# Patient Record
Sex: FEMALE | Race: WHITE
[De-identification: names, ages, dates, MRNs, and addresses within clinical notes are randomized per-mention and may not be internally consistent; named-entity substitution may affect disease eponyms.]

---

## 2019-08-30 ENCOUNTER — HOSPITAL ENCOUNTER (INPATIENT)
Dept: HOSPITAL 12 - ER | Age: 79
LOS: 13 days | Discharge: HOME HEALTH | DRG: 885 | End: 2019-09-12
Payer: MEDICARE

## 2019-08-30 VITALS — BODY MASS INDEX: 25.76 KG/M2 | WEIGHT: 170 LBS | HEIGHT: 68 IN

## 2019-08-30 DIAGNOSIS — Z59.0: ICD-10-CM

## 2019-08-30 DIAGNOSIS — E11.65: ICD-10-CM

## 2019-08-30 DIAGNOSIS — Z87.440: ICD-10-CM

## 2019-08-30 DIAGNOSIS — M79.7: ICD-10-CM

## 2019-08-30 DIAGNOSIS — G89.29: ICD-10-CM

## 2019-08-30 DIAGNOSIS — D69.6: ICD-10-CM

## 2019-08-30 DIAGNOSIS — F20.0: Primary | ICD-10-CM

## 2019-08-30 DIAGNOSIS — N39.0: ICD-10-CM

## 2019-08-30 PROCEDURE — A4663 DIALYSIS BLOOD PRESSURE CUFF: HCPCS

## 2019-08-30 SDOH — ECONOMIC STABILITY - HOUSING INSECURITY: HOMELESSNESS: Z59.0

## 2019-08-31 VITALS — DIASTOLIC BLOOD PRESSURE: 76 MMHG | SYSTOLIC BLOOD PRESSURE: 164 MMHG

## 2019-08-31 VITALS — SYSTOLIC BLOOD PRESSURE: 122 MMHG | DIASTOLIC BLOOD PRESSURE: 65 MMHG

## 2019-08-31 VITALS — DIASTOLIC BLOOD PRESSURE: 60 MMHG | SYSTOLIC BLOOD PRESSURE: 121 MMHG

## 2019-08-31 VITALS — SYSTOLIC BLOOD PRESSURE: 147 MMHG | DIASTOLIC BLOOD PRESSURE: 69 MMHG

## 2019-08-31 RX ADMIN — OLANZAPINE SCH MG: 5 TABLET, ORALLY DISINTEGRATING ORAL at 21:22

## 2019-08-31 RX ADMIN — HYDROCODONE BITARTRATE AND ACETAMINOPHEN PRN TAB: 5; 325 TABLET ORAL at 21:30

## 2019-08-31 NOTE — NUR
HAND OFF AND SBAR GIVEN TO BIBI MALDONADO ([facility])



PT WILL BE ADMITTED UNDER DR SORTO



[facility] SWAB DONE.

PT IS COMPLIANT, DENIES SI/HI, CALM AND COMPLIANT

TRANSPORT TO ROOM ACCOMPANIED BY ER STAFF

## 2019-08-31 NOTE — NUR
Patient C/O lower back pain 5/10. Offered Norco 1 Tab PO PRN. Patient is alert and oriented. 
Cooperative and calm with care. Educated patient on her night medication. Participated in 
the plan of care.

## 2019-08-31 NOTE — NUR
received to care, from the emergency room, on a 72 hour hold, for gravely disabled, a 
transfer from [facility], in [place]. according to the hold, she 
came to the hospital, and appeared "grossly disorganized and psychotic". she had no viable 
self care plan, and was deemed by the evaluator to be unable to care for self, with out 
"adequate assistance". upon arrival on the unit, she was cooperative with interview, and 
assessment. denied si,or desire to harm self/others. pts rights book was given. pt was 
advised of [facility]. she stated she stays at a hotel by herself, and went to the hospital, after 
falling down. she denied any psychotic sx. after being interviewed, she was assisted to bed. 
currently appears to be asleep. no distress noted.

## 2019-09-01 VITALS — DIASTOLIC BLOOD PRESSURE: 62 MMHG | SYSTOLIC BLOOD PRESSURE: 115 MMHG

## 2019-09-01 VITALS — SYSTOLIC BLOOD PRESSURE: 144 MMHG | DIASTOLIC BLOOD PRESSURE: 63 MMHG

## 2019-09-01 VITALS — SYSTOLIC BLOOD PRESSURE: 133 MMHG | DIASTOLIC BLOOD PRESSURE: 71 MMHG

## 2019-09-01 LAB
BASOPHILS # BLD AUTO: 0 K/UL (ref 0–8)
BASOPHILS NFR BLD AUTO: 0.7 % (ref 0–2)
BUN SERPL-MCNC: 10 MG/DL (ref 7–18)
CHLORIDE SERPL-SCNC: 106 MMOL/L (ref 98–107)
CHOLEST SERPL-MCNC: 154 MG/DL (ref ?–200)
CO2 SERPL-SCNC: 24 MMOL/L (ref 21–32)
CREAT SERPL-MCNC: 0.7 MG/DL (ref 0.6–1.3)
EOSINOPHIL # BLD AUTO: 0.2 K/UL (ref 0–0.7)
EOSINOPHIL NFR BLD AUTO: 3.1 % (ref 0–7)
GLUCOSE SERPL-MCNC: 111 MG/DL (ref 74–106)
HCT VFR BLD AUTO: 36.7 % (ref 31.2–41.9)
HDLC SERPL-MCNC: 31 MG/DL (ref 40–60)
HGB BLD-MCNC: 12.3 G/DL (ref 10.9–14.3)
LYMPHOCYTES # BLD AUTO: 2.5 K/UL (ref 20–40)
LYMPHOCYTES NFR BLD AUTO: 39.8 % (ref 20.5–51.5)
MAGNESIUM SERPL-MCNC: 1.8 MG/DL (ref 1.8–2.4)
MCH RBC QN AUTO: 28.5 UUG (ref 24.7–32.8)
MCHC RBC AUTO-ENTMCNC: 34 G/DL (ref 32.3–35.6)
MCV RBC AUTO: 84.8 FL (ref 75.5–95.3)
MONOCYTES # BLD AUTO: 0.6 K/UL (ref 2–10)
MONOCYTES NFR BLD AUTO: 9.1 % (ref 0–11)
NEUTROPHILS # BLD AUTO: 3 K/UL (ref 1.8–8.9)
NEUTROPHILS NFR BLD AUTO: 47.3 % (ref 38.5–71.5)
PHOSPHATE SERPL-MCNC: 3.8 MG/DL (ref 2.5–4.9)
PLATELET # BLD AUTO: 167 K/UL (ref 179–408)
POTASSIUM SERPL-SCNC: 3.7 MMOL/L (ref 3.5–5.1)
RBC # BLD AUTO: 4.32 MIL/UL (ref 3.63–4.92)
TRIGL SERPL-MCNC: 162 MG/DL (ref 30–150)
TSH SERPL DL<=0.005 MIU/L-ACNC: 2.12 MIU/ML (ref 0.36–3.74)
WBC # BLD AUTO: 6.2 K/UL (ref 3.8–11.8)

## 2019-09-01 RX ADMIN — Medication SCH MG: at 17:27

## 2019-09-01 RX ADMIN — HYDROCODONE BITARTRATE AND ACETAMINOPHEN PRN TAB: 5; 325 TABLET ORAL at 06:27

## 2019-09-01 RX ADMIN — METFORMIN HYDROCHLORIDE SCH MG: 500 TABLET ORAL at 17:28

## 2019-09-01 RX ADMIN — OLANZAPINE SCH MG: 5 TABLET, ORALLY DISINTEGRATING ORAL at 20:33

## 2019-09-02 VITALS — DIASTOLIC BLOOD PRESSURE: 88 MMHG | SYSTOLIC BLOOD PRESSURE: 135 MMHG

## 2019-09-02 VITALS — DIASTOLIC BLOOD PRESSURE: 65 MMHG | SYSTOLIC BLOOD PRESSURE: 113 MMHG

## 2019-09-02 VITALS — DIASTOLIC BLOOD PRESSURE: 70 MMHG | SYSTOLIC BLOOD PRESSURE: 119 MMHG

## 2019-09-02 RX ADMIN — OLANZAPINE SCH MG: 5 TABLET, ORALLY DISINTEGRATING ORAL at 20:07

## 2019-09-02 RX ADMIN — HYDROCODONE BITARTRATE AND ACETAMINOPHEN PRN TAB: 5; 325 TABLET ORAL at 02:18

## 2019-09-02 RX ADMIN — METFORMIN HYDROCHLORIDE SCH MG: 500 TABLET ORAL at 17:20

## 2019-09-02 RX ADMIN — MAGNESIUM HYDROXIDE PRN ML: 400 SUSPENSION ORAL at 08:47

## 2019-09-02 RX ADMIN — Medication SCH MG: at 16:46

## 2019-09-02 RX ADMIN — Medication SCH MG: at 08:39

## 2019-09-02 RX ADMIN — HYDROCODONE BITARTRATE AND ACETAMINOPHEN PRN TAB: 5; 325 TABLET ORAL at 15:41

## 2019-09-02 RX ADMIN — METFORMIN HYDROCHLORIDE SCH MG: 500 TABLET ORAL at 08:39

## 2019-09-02 RX ADMIN — MAGNESIUM HYDROXIDE PRN ML: 400 SUSPENSION ORAL at 21:49

## 2019-09-03 VITALS — SYSTOLIC BLOOD PRESSURE: 121 MMHG | DIASTOLIC BLOOD PRESSURE: 63 MMHG

## 2019-09-03 VITALS — DIASTOLIC BLOOD PRESSURE: 63 MMHG | SYSTOLIC BLOOD PRESSURE: 147 MMHG

## 2019-09-03 VITALS — DIASTOLIC BLOOD PRESSURE: 80 MMHG | SYSTOLIC BLOOD PRESSURE: 143 MMHG

## 2019-09-03 RX ADMIN — Medication SCH MG: at 08:19

## 2019-09-03 RX ADMIN — OLANZAPINE SCH MG: 5 TABLET, ORALLY DISINTEGRATING ORAL at 21:05

## 2019-09-03 RX ADMIN — HYDROCODONE BITARTRATE AND ACETAMINOPHEN PRN TAB: 5; 325 TABLET ORAL at 21:12

## 2019-09-03 RX ADMIN — METFORMIN HYDROCHLORIDE SCH MG: 500 TABLET ORAL at 17:09

## 2019-09-03 RX ADMIN — Medication SCH MG: at 17:09

## 2019-09-03 RX ADMIN — LIDOCAINE SCH PATCH: 50 PATCH CUTANEOUS at 18:25

## 2019-09-03 RX ADMIN — METFORMIN HYDROCHLORIDE SCH MG: 500 TABLET ORAL at 08:19

## 2019-09-03 RX ADMIN — HYDROCODONE BITARTRATE AND ACETAMINOPHEN PRN TAB: 5; 325 TABLET ORAL at 00:13

## 2019-09-03 NOTE — NUR
Received Pi in bed awake and A+Ox3. Expresses flat affect and depressed mood. Isolative and 
seclusive, remains in her room for most of the shift. Initially refused HS Zyprexa stating, 
"I've never taken any meds and this makes me feel so tired." Pt was educated on risks, 
benefits, and reportable s/e. Pt also encouraged to speak to her psychiatrist regarding her 
dosage and concerns. Pt took the medication after encouragement and education provided. 
Denies SI/HI/AH/VH. C/o constipation, prune juice and MOM provided per request. VS stable 
denies pain. C/o insomnia and restlessness Restoril 7.5mg administered with good effect.

## 2019-09-03 NOTE — NUR
received to care, asleep,in bed, but easy to awaken,and pleasant upon approach. PRN norco 
given at 2112, for lower back pain. as of 2200, she appears to be asleep. no distress noted. 
will continue to monitor closely.

## 2019-09-04 VITALS — SYSTOLIC BLOOD PRESSURE: 138 MMHG | DIASTOLIC BLOOD PRESSURE: 91 MMHG

## 2019-09-04 VITALS — SYSTOLIC BLOOD PRESSURE: 124 MMHG | DIASTOLIC BLOOD PRESSURE: 61 MMHG

## 2019-09-04 VITALS — SYSTOLIC BLOOD PRESSURE: 149 MMHG | DIASTOLIC BLOOD PRESSURE: 71 MMHG

## 2019-09-04 RX ADMIN — METFORMIN HYDROCHLORIDE SCH MG: 500 TABLET ORAL at 17:02

## 2019-09-04 RX ADMIN — METFORMIN HYDROCHLORIDE SCH MG: 500 TABLET ORAL at 08:12

## 2019-09-04 RX ADMIN — Medication SCH MG: at 08:12

## 2019-09-04 RX ADMIN — HYDROCODONE BITARTRATE AND ACETAMINOPHEN PRN TAB: 5; 325 TABLET ORAL at 18:22

## 2019-09-04 RX ADMIN — LIDOCAINE SCH PATCH: 50 PATCH CUTANEOUS at 16:58

## 2019-09-04 RX ADMIN — OLANZAPINE SCH MG: 5 TABLET, ORALLY DISINTEGRATING ORAL at 20:47

## 2019-09-05 VITALS — SYSTOLIC BLOOD PRESSURE: 110 MMHG | DIASTOLIC BLOOD PRESSURE: 59 MMHG

## 2019-09-05 VITALS — SYSTOLIC BLOOD PRESSURE: 133 MMHG | DIASTOLIC BLOOD PRESSURE: 87 MMHG

## 2019-09-05 RX ADMIN — METFORMIN HYDROCHLORIDE SCH MG: 500 TABLET ORAL at 09:05

## 2019-09-05 RX ADMIN — DOCUSATE SODIUM SCH MG: 100 CAPSULE, LIQUID FILLED ORAL at 09:05

## 2019-09-05 RX ADMIN — HYDROCODONE BITARTRATE AND ACETAMINOPHEN PRN TAB: 5; 325 TABLET ORAL at 00:04

## 2019-09-05 RX ADMIN — HYDROCODONE BITARTRATE AND ACETAMINOPHEN PRN TAB: 5; 325 TABLET ORAL at 20:58

## 2019-09-05 RX ADMIN — LIDOCAINE SCH PATCH: 50 PATCH CUTANEOUS at 16:50

## 2019-09-05 RX ADMIN — METFORMIN HYDROCHLORIDE SCH MG: 500 TABLET ORAL at 16:50

## 2019-09-05 RX ADMIN — OLANZAPINE SCH MG: 5 TABLET, ORALLY DISINTEGRATING ORAL at 20:54

## 2019-09-05 NOTE — NUR
Patient slept 5.30 hours so far. Medicated once in the night for back pain. Medication was 
effective. No acute distress noted and patient remains asleep at this time.

## 2019-09-05 NOTE — NUR
PATIENT RECEIVED IN BED AWAKE. PATIENT IS ISOLATIVE/WITHDRAWN. PATIENT IS CALM AND 
COOPERATIVE. PATIENT IS EASILY AGITATED IS REDIRECTABLE. PATIENT COMPLAINT WITH MEDICATION. 
PATIENT STATES 9/10 BACK PAIN, PAIN MEDICATION GIVEN AS ORDERED. NO AGGRESSIVE OR COMBATIVE 
BEHAVIOR NOTED WILL CONTINUE TO MONITOR. BED IN LOWEST POSITION, BED LOCKED, AND BED ALARM 
ON WHILE IN BED.

## 2019-09-06 VITALS — SYSTOLIC BLOOD PRESSURE: 122 MMHG | DIASTOLIC BLOOD PRESSURE: 69 MMHG

## 2019-09-06 VITALS — DIASTOLIC BLOOD PRESSURE: 65 MMHG | SYSTOLIC BLOOD PRESSURE: 122 MMHG

## 2019-09-06 RX ADMIN — DOCUSATE SODIUM SCH MG: 100 CAPSULE, LIQUID FILLED ORAL at 08:42

## 2019-09-06 RX ADMIN — METFORMIN HYDROCHLORIDE SCH MG: 500 TABLET ORAL at 08:42

## 2019-09-06 RX ADMIN — HYDROCODONE BITARTRATE AND ACETAMINOPHEN PRN TAB: 5; 325 TABLET ORAL at 20:45

## 2019-09-06 RX ADMIN — OLANZAPINE SCH MG: 5 TABLET, ORALLY DISINTEGRATING ORAL at 20:32

## 2019-09-06 RX ADMIN — METFORMIN HYDROCHLORIDE SCH MG: 500 TABLET ORAL at 17:07

## 2019-09-06 RX ADMIN — LIDOCAINE SCH PATCH: 50 PATCH CUTANEOUS at 17:07

## 2019-09-07 VITALS — DIASTOLIC BLOOD PRESSURE: 57 MMHG | SYSTOLIC BLOOD PRESSURE: 120 MMHG

## 2019-09-07 VITALS — DIASTOLIC BLOOD PRESSURE: 63 MMHG | SYSTOLIC BLOOD PRESSURE: 113 MMHG

## 2019-09-07 VITALS — DIASTOLIC BLOOD PRESSURE: 69 MMHG | SYSTOLIC BLOOD PRESSURE: 121 MMHG

## 2019-09-07 RX ADMIN — METFORMIN HYDROCHLORIDE SCH MG: 500 TABLET ORAL at 08:56

## 2019-09-07 RX ADMIN — MAGNESIUM HYDROXIDE PRN ML: 400 SUSPENSION ORAL at 20:23

## 2019-09-07 RX ADMIN — OLANZAPINE SCH MG: 5 TABLET, ORALLY DISINTEGRATING ORAL at 20:23

## 2019-09-07 RX ADMIN — DOCUSATE SODIUM SCH MG: 100 CAPSULE, LIQUID FILLED ORAL at 08:56

## 2019-09-07 RX ADMIN — HYDROCODONE BITARTRATE AND ACETAMINOPHEN PRN TAB: 5; 325 TABLET ORAL at 22:56

## 2019-09-07 RX ADMIN — LIDOCAINE SCH PATCH: 50 PATCH CUTANEOUS at 17:04

## 2019-09-07 RX ADMIN — METFORMIN HYDROCHLORIDE SCH MG: 500 TABLET ORAL at 17:04

## 2019-09-08 VITALS — DIASTOLIC BLOOD PRESSURE: 61 MMHG | SYSTOLIC BLOOD PRESSURE: 131 MMHG

## 2019-09-08 VITALS — DIASTOLIC BLOOD PRESSURE: 69 MMHG | SYSTOLIC BLOOD PRESSURE: 147 MMHG

## 2019-09-08 VITALS — SYSTOLIC BLOOD PRESSURE: 143 MMHG | DIASTOLIC BLOOD PRESSURE: 70 MMHG

## 2019-09-08 RX ADMIN — DOCUSATE SODIUM SCH MG: 100 CAPSULE, LIQUID FILLED ORAL at 08:51

## 2019-09-08 RX ADMIN — LIDOCAINE SCH PATCH: 50 PATCH CUTANEOUS at 16:29

## 2019-09-08 RX ADMIN — OLANZAPINE SCH MG: 5 TABLET, ORALLY DISINTEGRATING ORAL at 20:27

## 2019-09-08 RX ADMIN — HYDROCODONE BITARTRATE AND ACETAMINOPHEN PRN TAB: 5; 325 TABLET ORAL at 20:37

## 2019-09-08 RX ADMIN — METFORMIN HYDROCHLORIDE SCH MG: 500 TABLET ORAL at 17:09

## 2019-09-08 RX ADMIN — METFORMIN HYDROCHLORIDE SCH MG: 500 TABLET ORAL at 08:51

## 2019-09-08 NOTE — NUR
RECEIVED PATIENT IN BED AWAKE. PATIENT IS ISOLATIVE,WITHDRAWN BUT CALM AND COOPERATIVE. 
OCCASIONALLY IRRITATED BUT RE DIRECTABLE. SHE IS COMPLAINT WITH MEDICATION.COMPLAINED OF 
LOWER BACK ACHE RATED 5/10 AND REQUESTED FOR NORCO. SAME GIVEN AS ORDERED WITH FAIRLY GOOD 
EFFECT. VISUAL CHECKS MADE ON HER FOR SAFETY. WILL CONTINUE TO MONITOR. BED IN LOWEST 
POSITION, BED LOCKED, AND BED ALARM ON WHILE IN BED.

## 2019-09-09 VITALS — DIASTOLIC BLOOD PRESSURE: 62 MMHG | SYSTOLIC BLOOD PRESSURE: 111 MMHG

## 2019-09-09 VITALS — DIASTOLIC BLOOD PRESSURE: 72 MMHG | SYSTOLIC BLOOD PRESSURE: 139 MMHG

## 2019-09-09 VITALS — DIASTOLIC BLOOD PRESSURE: 86 MMHG | SYSTOLIC BLOOD PRESSURE: 146 MMHG

## 2019-09-09 RX ADMIN — DOCUSATE SODIUM SCH MG: 100 CAPSULE, LIQUID FILLED ORAL at 09:12

## 2019-09-09 RX ADMIN — LIDOCAINE SCH PATCH: 50 PATCH CUTANEOUS at 16:29

## 2019-09-09 RX ADMIN — METFORMIN HYDROCHLORIDE SCH MG: 500 TABLET ORAL at 09:13

## 2019-09-09 RX ADMIN — HYDROCODONE BITARTRATE AND ACETAMINOPHEN PRN TAB: 5; 325 TABLET ORAL at 21:14

## 2019-09-09 RX ADMIN — OLANZAPINE SCH MG: 5 TABLET, ORALLY DISINTEGRATING ORAL at 20:08

## 2019-09-09 RX ADMIN — MAGNESIUM HYDROXIDE PRN ML: 400 SUSPENSION ORAL at 16:28

## 2019-09-09 RX ADMIN — METFORMIN HYDROCHLORIDE SCH MG: 500 TABLET ORAL at 18:24

## 2019-09-10 VITALS — SYSTOLIC BLOOD PRESSURE: 120 MMHG | DIASTOLIC BLOOD PRESSURE: 49 MMHG

## 2019-09-10 VITALS — DIASTOLIC BLOOD PRESSURE: 61 MMHG | SYSTOLIC BLOOD PRESSURE: 112 MMHG

## 2019-09-10 VITALS — SYSTOLIC BLOOD PRESSURE: 115 MMHG | DIASTOLIC BLOOD PRESSURE: 51 MMHG

## 2019-09-10 RX ADMIN — HYDROCODONE BITARTRATE AND ACETAMINOPHEN PRN TAB: 5; 325 TABLET ORAL at 21:25

## 2019-09-10 RX ADMIN — DOCUSATE SODIUM SCH MG: 100 CAPSULE, LIQUID FILLED ORAL at 08:29

## 2019-09-10 RX ADMIN — LIDOCAINE SCH PATCH: 50 PATCH CUTANEOUS at 16:15

## 2019-09-10 RX ADMIN — METFORMIN HYDROCHLORIDE SCH MG: 500 TABLET ORAL at 08:29

## 2019-09-10 RX ADMIN — METFORMIN HYDROCHLORIDE SCH MG: 500 TABLET ORAL at 18:58

## 2019-09-10 RX ADMIN — Medication SCH EACH: at 21:31

## 2019-09-10 RX ADMIN — OLANZAPINE SCH MG: 5 TABLET, ORALLY DISINTEGRATING ORAL at 21:25

## 2019-09-10 NOTE — NUR
Patient received awake in bed, resting comfortably with 1:1 sitter in room.  Patient is 
alert/oriented x3 and is complaining of 6/10 pain related to her diagnosis of chronic back 
pain.  All safety and fall precaution measures are in place.  Will continue to monitor.

## 2019-09-10 NOTE — NUR
END OF SHIFT REPORT

Transfer to 325 as GPS overflow with 1:1 sitter at bedside; pt slept well in between care; 
c/o pain x1 and Norco given; pt requested for restoril and slept 5 hours and 45 mins; 
assisted to meet hygiene needs; continue to monitor;

## 2019-09-10 NOTE — NUR
Patient is AO2-3, no distress, cooperative with treatment, 1:1 sitter at bedside; 

continue to monitor;

## 2019-09-10 NOTE — NUR
Independent living consultant is here, at request of SW, to evaluate patient for placement 
in assisted living/independent living facility.

## 2019-09-11 VITALS — SYSTOLIC BLOOD PRESSURE: 147 MMHG | DIASTOLIC BLOOD PRESSURE: 69 MMHG

## 2019-09-11 VITALS — SYSTOLIC BLOOD PRESSURE: 113 MMHG | DIASTOLIC BLOOD PRESSURE: 55 MMHG

## 2019-09-11 VITALS — SYSTOLIC BLOOD PRESSURE: 104 MMHG | DIASTOLIC BLOOD PRESSURE: 55 MMHG

## 2019-09-11 RX ADMIN — Medication SCH EACH: at 20:32

## 2019-09-11 RX ADMIN — METFORMIN HYDROCHLORIDE SCH MG: 500 TABLET ORAL at 09:30

## 2019-09-11 RX ADMIN — Medication SCH EACH: at 06:24

## 2019-09-11 RX ADMIN — HYDROCODONE BITARTRATE AND ACETAMINOPHEN PRN TAB: 5; 325 TABLET ORAL at 20:49

## 2019-09-11 RX ADMIN — SODIUM CHLORIDE PRN UNIT: 9 INJECTION, SOLUTION INTRAVENOUS at 20:42

## 2019-09-11 RX ADMIN — METFORMIN HYDROCHLORIDE SCH MG: 500 TABLET ORAL at 16:56

## 2019-09-11 RX ADMIN — DOCUSATE SODIUM SCH MG: 100 CAPSULE, LIQUID FILLED ORAL at 09:30

## 2019-09-11 RX ADMIN — Medication SCH EACH: at 11:58

## 2019-09-11 RX ADMIN — Medication SCH EACH: at 16:56

## 2019-09-11 RX ADMIN — LIDOCAINE SCH PATCH: 50 PATCH CUTANEOUS at 16:56

## 2019-09-11 RX ADMIN — SODIUM CHLORIDE PRN UNIT: 9 INJECTION, SOLUTION INTRAVENOUS at 17:00

## 2019-09-11 RX ADMIN — OLANZAPINE SCH MG: 5 TABLET, ORALLY DISINTEGRATING ORAL at 20:06

## 2019-09-11 NOTE — NUR
Discharge Planning: 



Patient was evaluated by Assisted Living placement Agency - [facility] [[phone]]. 
Per Admissions - Arturo, patient was accepted for Assisted Living Placement at [facility] 
[[address] [place] [address]]. Patient scheduled for tentative dc tomorrow at 
11:00a. Selena, Director [[phone]] at [facility], will be ready to receive patient. 
This writer informed nursing - Toña and antonio request for home health referral.

## 2019-09-11 NOTE — NUR
Received patient in bed, asleep but easily arouse to touch and sound. calm and cooperative. 
med compliant. no behavioral issue at this time. will continue to monitor patient safety.

## 2019-09-11 NOTE — NUR
Patient slept comfortably throughout night with 1:1 sitter at bedside. Complaint of pain was 
addressed with prescribed analgesics.  Patient was compliant with all aspects of care and 
medication regimen. All safety and fall precaution measures remain in place.    .

## 2019-09-11 NOTE — NUR
At this time patient brought down to room 139B. Patient AAOX4. placed in bed with 
assistance. 

-------------------------------------------------------------------------------

Addendum: 09/11/19 at 1858 by SANAZ FREMEAN RN

-------------------------------------------------------------------------------

Care plan will be endorse to incoming shift.

## 2019-09-11 NOTE — NUR
Received patient in bed, asleep and easy to wake up . Denies sob or pain at this time. All 
needs met. Safety and fall precautions in place. Call light in reach, bed in low position 
and locked. Will continue to monitor.

## 2019-09-12 VITALS — SYSTOLIC BLOOD PRESSURE: 136 MMHG | DIASTOLIC BLOOD PRESSURE: 65 MMHG

## 2019-09-12 RX ADMIN — METFORMIN HYDROCHLORIDE SCH MG: 500 TABLET ORAL at 08:17

## 2019-09-12 RX ADMIN — Medication SCH EACH: at 12:16

## 2019-09-12 RX ADMIN — Medication SCH EACH: at 06:34

## 2019-09-12 RX ADMIN — DOCUSATE SODIUM SCH MG: 100 CAPSULE, LIQUID FILLED ORAL at 08:17

## 2019-09-12 RX ADMIN — SODIUM CHLORIDE PRN UNIT: 9 INJECTION, SOLUTION INTRAVENOUS at 12:21

## 2019-09-12 NOTE — NUR
Patient is discharged via set transportation, belongings given and dc papers signed. Took 
patient outside via wheel chair

Patient condition is Stable

## 2021-11-30 NOTE — NUR
Pt arrived in the unit for rehab admission at 2040 from [facility] via gurney. Admitting Dx: R hip 
fx, s/p R hip closed percutaneous pinning on 11/27/2021. On 2L O2 via NC, no acute distress 
noted. Denies pain/ discomfort at this time. Pt has x1 right hip surgical incision, no 
discharge noted. Dressing changed. Pt has Holliday catheter in place. Notified Dr. Garcia of 
admission. Notified Dr. Domingo and requested for [employer]le and MD stated that he will do it. 
Safety measures maintained. Bed alarm on. Call light within reach. Will continue to monitor.

## 2021-12-01 NOTE — NUR
Receive PT resting in bed AAOx4 Dx: R hip fx, s/p R hip closed percutaneous pinning on 
11/27/2021. On 2L O2 via NC, no acute distress noted 02 sat 99 %. C/O of pain/  at this 
time. Pt has x1 right hip surgical incision, no discharge noted. Dressing intact Pt has 
Holliday catheter in place draining well . Safety measures maintained. Bed alarm on. Call light 
within reach. Will continue to monitor.

## 2021-12-01 NOTE — NUR
patient remained stable through the shift. continue on 02 @ 2L/MIN via nc. 02 sating 
99%.F/C in place draining well  Assisted with all her needs. Kept call light within reach. 
All due meds given medicated with senna x 1 . No distress identified. Safety measures 
maintained. No pain identified. Will endorse to the next shift for continuity of care.

## 2021-12-02 NOTE — NUR
Pt slept throughout the night, easily arousable for care and able to make needs known. On O2 
at 2lpm via NC saturating at 95%. Medicated pain x1 and noted effective. Ambien PRN also 
given. All needs attended. Call light placed within reach. Frequent visual checks done. Will 
endorse to next shift for continuity of care.

## 2021-12-03 NOTE — NUR
Patient remains alert, oriented x4, not in any form of distress, on room air. She complained 
of pain on the right hip, given PRN pain medication as ordered with noted relief. Patient 
participated with PT and OT. Assisted with her needs promptly. Call light and frequently 
used items placed within patient's reach.

## 2021-12-04 NOTE — NUR
Received pt awake on bed with no respiratory distress noted. Dulcolax supp given at start of 
shift, BM x1 noted. Medicated pain x1 and noted effective. All needs attended. Call light 
placed within reach. Frequent visual checks done. Will endorse to next shift for continuity 
of care.

## 2021-12-04 NOTE — NUR
Patient awake and able to make needs known. no respiratory distress noted. C/o rt hip pain 
Medicated pain x1 and noted effective. Rt hip dressing changed.No bleeding noted.F/c 
draining well with yellow urine output.All needs attended. Call light placed within reach. 
Will endorse to next shift for continuity of care.

## 2021-12-05 NOTE — NUR
Received patient awake in bed. AOX4. IV on L hand patent and intact. On room air, saturating 
at  92%. Patient denies chest pain, SOB or dizziness, however report pain of 3/10 on right 
hip, pt refused to take medication, diverted to non-pharmacological techniques instead. 
Right hip dressing clean and intact. HS accuchek done, showing results of 135mg/dl, patient 
refused to insulin.  Safety precautions and safety measures initiated. Bed in locked, call 
light button and frequently used medications within reach. Will continue to monitor.

## 2021-12-06 NOTE — NUR
PATIENT PARTICIPATED IN THERAPY EXERCISES AS ORDERED ALERT AND COOPERATIVE WITH NO C/O PAIN 
AT THIS TIME.

## 2021-12-06 NOTE — NUR
RECEIVED PATIENT IN BED AWAKE ALERT AND ORIENTED DENIES PAIN OR DISCOMFORTS AT THIS TIME ON 
ROOM AIR WITH NO SHORTNESS OF BREATH CALL LIGHTS AND PERSONAL BELONGINGS ARE WITHIN EASY 
REACH BLOOD SUGAR THIS AM  WHICH CALLS FOR SLIDING SCALE COVERAGE WITH REGULAR INSULIN 
BUT PATIENT REFUSED COVERAGE AS ORDERED.PATIENTS RIGHT TO REFUSE RESPECTED WILL OBSERVE FOR 
S/S OF HYPERGLYCEMIC REACTIONS.

## 2021-12-06 NOTE — NUR
Asked patient if she wants her stephens catheter removed and be switched to a diaper. Patient 
refused despite being taught the risk factors of having a prolonged stephens catheter. Endorsed 
to the day shift.

## 2021-12-06 NOTE — NUR
Patient advised she took a medication to help with her constipation. Patient advised that it 
is the only medication that will help with her bowel movement. Medication is Cascara 
Sagrada, she takes 2 pills a day. Endorsed to day shift to notify doctor.

## 2021-12-07 NOTE — NUR
PATIENT IS AWAKE ALERT AND VERBALISED NEEDS REQUESTED FOR NORCO FOR PAIN STATED THAT THE 
THERAPIST WILL BE HERE FOR HER PHYSICAL THERAPEUTIC EXERCISES ABOUT 1100 MEDICATED AS 
ORDERED RIGHT HIP REMAINS INTACT WITH NO DRAINAGE SALAZAR CATHETER REMAINS INTACT DISCUSSION 
WITH PATIENT REGARDING REMOVING CATH TO SEE IF SHE COULD VOID STATED NOT TODAY WILL CONSIDER 
TOMORROW WILL ENDORSE TO ASK THE DOCTOR TOMORROW FOR OKAY TO REMOVE CATH.CALL LIGHTS AND 
PERSONAL BELONGINGS ARE WITHIN EASY REACH AT THIS TIME WILL CONTINUE TO OBSERVE.

## 2021-12-07 NOTE — NUR
RESTING IN ROOM DENIES DISCOMFORTS BLOOD SUGAR  REFUSED REGULAR INSULIN COVERAGE PER 
SLIDING SCALE NO S/S OF HYPERGLYCEMIC REACTIONS AT THIS TIME WILL CONTINUE TO OBSERVE.

## 2021-12-08 NOTE — NUR
Patient awake, alert 0riented x 4 .Patient compliant with medication and care.C/O of pain at 
right hip pain medicated with pain med as order. On room air,  Holliday catheter draining well. 
incontinent of BM good pericare provided and skin care. Changed and repositioned.Rt hip 
percutaneous pinning with dressing  in place.No bleeding noted.Dressing change done. Safety 
measures in place, call light within reach.  up with PT

## 2021-12-08 NOTE — NUR
Patient resting in bed, AO x 4, able to state all needs. On room air. Gave pt prune juice to 
help with BM. F/C intact and draining. No complaints or signs of acute distress at this 
time. Call lights within reach, safety measures initiated. Belongings place by bedside 
within reach.

## 2021-12-08 NOTE — NUR
No significant changes overnight. Pt remains AxO x4, cooperative with care. On RA, VSS, 
afebrile. Tylenol PO given x1 for mild pain with relief.  F/C intact and patent.  Pt 
assisted with turning and repositioning. All needs attended, safety precautions maintained 
at all times. No acute distress noted. Call light within reach.

## 2021-12-09 NOTE — NUR
Patient resting in bed in semi fowlers position. AAO x3, denies any pain, on RA, No SOB. S/P 
right hip fracture. Continues on Pt for rehabilitation. No IV access at this time. Safety 
measures initiated, call light within reach.

## 2021-12-09 NOTE — NUR
The patient remained stable during the shift. No acute distress . Medicated with [place] 5/325 
BID at 0800 and 1300 ATC as per ordered for pain meds effective up in W/C  and ambulated 
with  PT  had a shower today, Holliday catheter draining well  yellow color urine. Kept call 
light within reach. Safety precaution maintained. All due meds given. All needs attended. 
Endorsed to the next shift for continuity of care.

## 2021-12-10 NOTE — NUR
Slept well this shift. Denies any pain or discomfort this shift. In significant events this 
shift. Call light within reach.

## 2021-12-11 NOTE — NUR
Received pt awake on bed with no respiratory distress noted. Senokot PO given, no BM yet. BS 
checked 141, refused insulin. Riskes and benefits explained, pt still refused stating that 
her blood sugar is ok and not that high. All needs attended. Call light placed within reach. 
Frequent visual checks done. Will continue to monitor.

## 2021-12-11 NOTE — NUR
Pt slept throughout the night, easily arousable for care and able to make needs known. 
Medicated pain x1 and noted effective. All needs attended. Call light placed within reach. 
Frequent visual checks done. Will endorse to next shift for continuity of care.

## 2021-12-12 NOTE — NUR
PATIENT REMAINED STABLE DURING THE SHIFT.  FREQUENT VISUAL CHECKS DONE. KEPT CALL LIGHT 
WITHIN REACH. DRESSING CHANGE DONE TO THE RIGHT HIP, INTACT, NO BLEEDING NOTED. ALL DUE MEDS 
GIVEN AS ORDERED. ALL NEEDS ATTENDED. SAFETY PRECAUTION MAINTAINED. WILL ENDORSE TO THE NEXT 
SHIFT FOR CONTINUITY OF CARE.

## 2021-12-12 NOTE — NUR
Received pt resting in bed and watching tv. AAO x4. No acute distress noted. Denies pain/ 
discomfort. Blood sugar 121, no insulin coverage given as per sliding scale. Due meds given 
as ordered. Safety measures maintained. Call light within reach. Will continue to monitor.

## 2021-12-13 NOTE — NUR
Patient resting in bed in semi fowlers position. AAO x3, denies any pain, on RA, No SOB. S/P 
right hip fracture. Continues on Pt for rehabilitation. No IV access at this time. States 
she urinated around 1830, Day shift nurse changed her diaper. No BAD pain, ABD is flat and 
non tender. Safety measures initiated, call light within reach.

## 2021-12-13 NOTE — NUR
Received an order from Oh Reynolds DNP with order to remove stephens catheter. Stephens catheter 
removed and patient tolerated well. She remains alert, oriented x 4, not in any form of 
distress, on room air. She denies any pain or discomfort at this time. Will continue to  
monitor.

## 2021-12-14 NOTE — NUR
RESTING DENIES DISCOMFORTS NOT IN DISTRESS AT THIS TIME CALL LIGHTS AND HER PERSONAL 
BELONGINGS ARE WITHIN EASY REACH AT THIS TIME WILL CONTINUE TO OBSERVE AND PROVIDE COMFORT.

## 2021-12-14 NOTE — NUR
MEDICATED FOR C/O PAIN IN HER RIGHT HIP AS ORDERED PATIENT STATED HER LAST BOWEL MOVEMENT 
WAS 3 DAYS AGO OFFERED TO GIVE HER LAXATIVE BUT SHE STATED SHE WANTS TO SLEEP AND NOT BE 
GETTING UP TO MOVE HER BOWEL PREFERS TO TAKE A LAXATIVE IN THE MORNING WILL OFFER EARLY AM.

## 2021-12-14 NOTE — NUR
Patient slept well this shift. Denies any pain or discomfort. Needs attended, call light 
within reach.

## 2021-12-14 NOTE — NUR
RECEIVED PATIENT AWAKE ALERT AND ORIENTED ON ROUTINE NORCO FOR PAIN AND HELPFUL RIGHT HIP 
WITH DRESSING DRY AND INTACT NO DRAINAGE AT THIS TIME NO SHORTNESS OF BREATH NOT IN DISTRESS 
AT THIS TIME WILL CONTINUE TO OBSERVE.

## 2021-12-15 NOTE — NUR
NO DISTRESS OR CONCERN IDENTIFIED DURING THE SHIFT. ALL NEEDS ATTENDED. ROUTINE MEDS GIVEN 
FOR PAIN. KEPT CALL LIGHT WITHIN REACH. FREQUENT VISUAL CHECKS DONE. WILL ENDORSE TO THE 
NEXT SHIFT FOR CONTINUITY OF CARE.

## 2021-12-16 NOTE — NUR
Slept throughout the night. Denies pain or SOB. Able to make needs known. Call light kept 
within reach. Safety and comfort provided. Will endorse to day shift.

## 2021-12-17 NOTE — NUR
Patient resting in bed. AAO x3, denies any pain, on RA, No SOB. S/P right hip closed 
percutaneous pinning. Continues on Pt for rehabilitation. No IV access at this time. 
incontinent of B&B voiding well, used diapers. , ABD is soft and non tender. Safety measures 
initiated, call light within reach.

## 2021-12-17 NOTE — NUR
CONTINUE PLAN OF CARE

-------------------------------------------------------------------------------

Addendum: 12/17/21 at 0103 by JOSHUA RODRIGUEZ RN

-------------------------------------------------------------------------------

Amended: Links added.

-------------------------------------------------------------------------------

Addendum: 12/17/21 at 0105 by JOSHUA RODRIGUEZ RN

-------------------------------------------------------------------------------

Amended: Links added.

-------------------------------------------------------------------------------

Addendum: 12/17/21 at 0106 by JOSHUA RODRIGUEZ RN

-------------------------------------------------------------------------------

Amended: Links added.

## 2021-12-17 NOTE — NUR
CONTINUE PLAN OF CARE

-------------------------------------------------------------------------------

Addendum: 12/17/21 at 0058 by JOSHUA RODRIGUEZ RN

-------------------------------------------------------------------------------

Amended: Links added.

-------------------------------------------------------------------------------

Addendum: 12/17/21 at 0059 by JOSHUA RODRIGUEZ RN

-------------------------------------------------------------------------------

Amended: Links added.

-------------------------------------------------------------------------------

Addendum: 12/17/21 at 0100 by JOSHUA RODRIGUEZ RN

-------------------------------------------------------------------------------

Amended: Links added.

-------------------------------------------------------------------------------

Addendum: 12/17/21 at 0101 by JOSHUA RODRIGUEZ RN

-------------------------------------------------------------------------------

Amended: Links added.

-------------------------------------------------------------------------------

Addendum: 12/17/21 at 0103 by JOSHUA RODRIGUEZ RN

-------------------------------------------------------------------------------

Amended: Links added.

-------------------------------------------------------------------------------

Addendum: 12/17/21 at 0105 by LAYDA JENNIFER RN

-------------------------------------------------------------------------------

Amended: Links added.

-------------------------------------------------------------------------------

Addendum: 12/17/21 at 0106 by JOSHUA RODRIGUEZ RN

-------------------------------------------------------------------------------

Amended: Links added.

## 2021-12-17 NOTE — NUR
Pt discharged to [facility] report given to Key MALDONADO. All discharged 
instruction and all personal belongings given to patient  ,arm ID band removed  PT Left in 
medical stable condition was transferred via [facility] ambulance accompany by 3 EMTs